# Patient Record
Sex: MALE | Race: WHITE | Employment: STUDENT | URBAN - METROPOLITAN AREA
[De-identification: names, ages, dates, MRNs, and addresses within clinical notes are randomized per-mention and may not be internally consistent; named-entity substitution may affect disease eponyms.]

---
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
## 2021-11-24 ENCOUNTER — HOSPITAL ENCOUNTER (OUTPATIENT)
Age: 21
Discharge: HOME | End: 2021-11-24
Payer: COMMERCIAL

## 2021-11-24 VITALS
DIASTOLIC BLOOD PRESSURE: 89 MMHG | WEIGHT: 160 LBS | BODY MASS INDEX: 25 KG/M2 | HEART RATE: 70 BPM | SYSTOLIC BLOOD PRESSURE: 147 MMHG | OXYGEN SATURATION: 97 % | TEMPERATURE: 98 F | RESPIRATION RATE: 18 BRPM

## 2021-11-24 DIAGNOSIS — H01.002 BLEPHARITIS OF RIGHT LOWER EYELID, UNSPECIFIED TYPE: Primary | ICD-10-CM

## 2021-11-24 PROCEDURE — 99203 OFFICE O/P NEW LOW 30 MIN: CPT

## 2021-11-24 RX ORDER — ERYTHROMYCIN 5 MG/G
1 OINTMENT OPHTHALMIC EVERY 6 HOURS
Qty: 1 G | Refills: 0 | Status: SHIPPED | OUTPATIENT
Start: 2021-11-24 | End: 2021-12-01

## 2021-11-24 NOTE — ED INITIAL ASSESSMENT (HPI)
Patient presents to IC with c/o swelling to right lower eyelid x 2 days. No drainage or pain associated with it. Not a contact lens wearer. No URI symptoms. Fully vaccinated.

## 2021-11-24 NOTE — ED PROVIDER NOTES
Patient Seen in: [facility]      History   Patient presents with:  Eye Problem    Stated Complaint: eye issue x 1 day    Subjective: This is a 25-year-old male with no significant past medical history.   Presents to immediate care for ri Temp src Temporal   SpO2 97 %   O2 Device None (Room air)       Current:/89   Pulse 70   Temp 97.8 °F (36.6 °C) (Temporal)   Resp 18   Wt 72.6 kg   SpO2 97%   BMI 25.06 kg/m²     Right Eye Chart Acuity: 20/40, Uncorrected  Left Eye Chart Acuity: 20 well-appearing and nontoxic looking. Presents to immediate care for mild erythema and swelling to the right lower lid. Denies he wears contacts. Vision is grossly intact. Denies any trauma or injury to the eye. Exam consistent with blepharitis.     DC

## (undated) NOTE — ED AVS SNAPSHOT
Rubén Ramirez   MRN: [id number]    Department:  [facility] Emergency Department   Date of Visit:  2/3/2018           Disclosure     Insurance plans vary and the physician(s) referred by the ER may not be covered by your plan.  Please contact your tell this physician (or your personal doctor if your instructions are to return to your personal doctor) about any new or lasting problems. The primary care or specialist physician will see patients referred from the [facility] Emergency Department.  Shelton Lombard